# Patient Record
(demographics unavailable — no encounter records)

---

## 2024-11-07 NOTE — PHYSICAL EXAM
[Midline] : trachea located in midline position [Normal] : no rashes [Nasal Endoscopy Performed] : nasal endoscopy was performed, see procedure section for findings [] : septum deviated bilaterally [de-identified] : cerumen in the ear canals; once removed normal EACs

## 2024-11-07 NOTE — CONSULT LETTER
[Dear  ___] : Dear  [unfilled], [Consult Letter:] : I had the pleasure of evaluating your patient, [unfilled]. [Please see my note below.] : Please see my note below. [Consult Closing:] : Thank you very much for allowing me to participate in the care of this patient.  If you have any questions, please do not hesitate to contact me. [Sincerely,] : Sincerely, [FreeTextEntry3] : Larry Griffith MD [facility] Associated Professor, [place]

## 2024-11-07 NOTE — END OF VISIT
[FreeTextEntry3] : I, Dr. Griffith personally performed the evaluation and management (E/M) services including all necessary procedures, for this new patient. That E/M includes conducting the clinically appropriate initial history &/or exam, assessing all conditions, and establishing the plan of care. Today, my JUNI, Alejandra Barnett, was here to observe &/or participate in the visit & follow plan of care established by me.

## 2024-11-07 NOTE — ASSESSMENT
[FreeTextEntry1] : Patient diminished hearing massive cerumen impaction impacted removed gave her some Floxin otic drops because she is very dry audiogram confirm essentially normal hearing also has postnasal drip endoscopically has a deviated septum cannot tolerate Flonase gives her headaches so we switched her over to azelastine to see if that will help with the postnasal drip.

## 2024-11-07 NOTE — HISTORY OF PRESENT ILLNESS
[de-identified] : Patient went to urgent care for another complaint but was told that she had cerumen in both ears. She does clean the ears at home. She admits that her hearing has been slightly muffled bilaterally. She denies ringing in the ears or dizziness  She does complain of nasal congestion with postnasal drip. She has been using FLonase since going to urgent care for an acute cold. It does give her headaches but she uses it anyway She has seasonal allergies and takes zyrtec

## 2024-11-07 NOTE — REVIEW OF SYSTEMS
[Hearing Loss] : hearing loss [Negative] : Heme/Lymph [Post Nasal Drip] : post nasal drip [As Noted in HPI] : as noted in HPI [Nasal Congestion] : nasal congestion

## 2024-12-17 NOTE — HISTORY OF PRESENT ILLNESS
[FreeTextEntry1] : Patient is a 47 year old female here today for a follow up visit.  She has a history of a benign left 5:00 ultrasound guided breast biopsy (3/15/2011) Left 7:30 benign core biopsy (5/2008) Left 1:30 benign core biopsy (2005) Family history of breast cancer in her maternal aunt in her early 60's and maternal great aunt. 7/26/2024 Bilateral breast MRI (high risk screening):  RIGHT BREAST: There are scattered enhancing nonspecific foci.  There is no suspicious enhancement in the right breast. LEFT BREAST: There are scattered enhancing nonspecific foci.  There is no suspicious enhancement in the left breast. Susceptibility artifact from a biopsy marker is noted in the lower breast. AXILLA/OTHER: There is no significant axillary or internal mammary lymphadenopathy. BI-RADS 2 10/31/2024 [facility]: 27.3%. The breasts are extremely dense, which lowers the sensitivity of mammography. No suspicious mass, suspicious microcalcifications, or other sign of malignancy is identified.  There is postbiopsy metallic clip in stable position and stable associated nodular asymmetry. BREAST ARTERIAL CALCIFICATION (JESSE): Grade 0 - No vascular calcifications. Note: The absence of breast arterial calcification does not exclude cardiovascular disease. Management of cardiovascular risk factors should be based clinically. Bilateral ultrasound: Right: No suspicious solid mass.  Multiple cysts and cyst clusters. Left: No suspicious solid mass. Multiple cysts and cyst clusters. 5:00 5 cm from the nipple 1.0 x 0.7 x 1.2 cm hypoechoic mass of prior benign biopsy site stable. BI-RADS 2  She states that she can feel the left lower outer known palpable mass.  She denies any new concerns.

## 2024-12-17 NOTE — PHYSICAL EXAM
[Normocephalic] : normocephalic [Atraumatic] : atraumatic [EOMI] : extra ocular movement intact [Sclera nonicteric] : sclera nonicteric [Supple] : supple [No Supraclavicular Adenopathy] : no supraclavicular adenopathy [No Cervical Adenopathy] : no cervical adenopathy [No Thyromegaly] : no thyromegaly [Examined in the supine and seated position] : examined in the supine and seated position [Asymmetrical] : asymmetrical [No dominant masses] : no dominant masses in right breast  [No dominant masses] : no dominant masses left breast [No Nipple Retraction] : no left nipple retraction [No Nipple Discharge] : no left nipple discharge [No Axillary Lymphadenopathy] : no left axillary lymphadenopathy [No Edema] : no edema [No Rashes] : no rashes [No Ulceration] : no ulceration [de-identified] : Left breast is larger than her right. [de-identified] : 5:00 subtle palpable mass (N4cm) 1.1 x 1.2 cm c/w site of previously biopsied fibroadenoma.

## 2024-12-17 NOTE — ASSESSMENT
[FreeTextEntry1] : Elevated breast cancer YUMIKO risk Clinical breast exam negative Breast imaging negative to date  1. Annual bilateral mammogram and breast ultrasound due 11/2025 2. Follow up office visit due in 1 year 3. Advised monthly self breast examinations and advised her to contact me if she has any concerns. 4. Bilateral breast MRI with IV contrast due 7/2025

## 2024-12-17 NOTE — CONSULT LETTER
[Dear  ___] : Dear  [unfilled], [Courtesy Letter:] : I had the pleasure of seeing your patient, [unfilled], in my office today. [Please see my note below.] : Please see my note below. [Sincerely,] : Sincerely, [DrStalin  ___] : Dr. JAIMES [FreeTextEntry3] : Susan M. Palleschi, MD, FACS\par  Division of Breast Surgery\par  Director, Breast Surgery\par  [facility]\par  [address] [place] [address]\par  [address]\[place] [address]\par  (Phone) [phone]\par  (Fax) [phone]

## 2024-12-17 NOTE — HISTORY OF PRESENT ILLNESS
[FreeTextEntry1] : Patient is a 47 year old female here today for a follow up visit.  She has a history of a benign left 5:00 ultrasound guided breast biopsy (3/15/2011) Left 7:30 benign core biopsy (5/2008) Left 1:30 benign core biopsy (2005) Family history of breast cancer in her maternal aunt in her early 60's and maternal great aunt. 7/26/2024 Bilateral breast MRI (high risk screening):  RIGHT BREAST: There are scattered enhancing nonspecific foci.  There is no suspicious enhancement in the right breast. LEFT BREAST: There are scattered enhancing nonspecific foci.  There is no suspicious enhancement in the left breast. Susceptibility artifact from a biopsy marker is noted in the lower breast. AXILLA/OTHER: There is no significant axillary or internal mammary lymphadenopathy. BI-RADS 2 10/31/2024 [facility] 27.[facility]%. The breasts are extremely dense, which lowers the sensitivity of mammography. No suspicious mass, suspicious microcalcifications, or other sign of malignancy is identified.  There is postbiopsy metallic clip in stable position and stable associated nodular asymmetry. BREAST ARTERIAL CALCIFICATION (JESSE): Grade 0 - No vascular calcifications. Note: The absence of breast arterial calcification does not exclude cardiovascular disease. Management of cardiovascular risk factors should be based clinically. Bilateral ultrasound: Right: No suspicious solid mass.  Multiple cysts and cyst clusters. Left: No suspicious solid mass. Multiple cysts and cyst clusters. 5:00 5 cm from the nipple 1.0 x 0.7 x 1.2 cm hypoechoic mass of prior benign biopsy site stable. BI-RADS 2  She states that she can feel the left lower outer known palpable mass.  She denies any new concerns.

## 2024-12-17 NOTE — PHYSICAL EXAM
[Normocephalic] : normocephalic [Atraumatic] : atraumatic [EOMI] : extra ocular movement intact [Sclera nonicteric] : sclera nonicteric [Supple] : supple [No Supraclavicular Adenopathy] : no supraclavicular adenopathy [No Cervical Adenopathy] : no cervical adenopathy [No Thyromegaly] : no thyromegaly [Examined in the supine and seated position] : examined in the supine and seated position [Asymmetrical] : asymmetrical [No dominant masses] : no dominant masses in right breast  [No dominant masses] : no dominant masses left breast [No Nipple Retraction] : no left nipple retraction [No Nipple Discharge] : no left nipple discharge [No Axillary Lymphadenopathy] : no left axillary lymphadenopathy [No Edema] : no edema [No Rashes] : no rashes [No Ulceration] : no ulceration [de-identified] : Left breast is larger than her right. [de-identified] : 5:00 subtle palpable mass (N4cm) 1.1 x 1.2 cm c/w site of previously biopsied fibroadenoma.

## 2024-12-17 NOTE — CONSULT LETTER
July 30, 2018     Jaclyn Hector, [address] [place] [address]    Patient: Aston Novoa  YOB: 1935   Date of Visit: 7/30/2018       Dear Dr Gregory Dear: Thank you for referring Britton Hackett to me for evaluation  Below are my notes for this consultation  If you have questions, please do not hesitate to call me  I look forward to following your patient along with you  Sincerely,        Mary Jo Esquivel MD        CC: No Recipients  Mary Jo Esquivel MD  7/30/2018  9:28 AM  Sign at close encounter  Assessment/Plan:   1  Gross hematuria on Eliquis  Renal ultrasound and cystourethroscopy failed to reveal any acute pathology that requires intervention  2   BPH with obstructive symptoms  The patient continues on Hytrin 2 mg p o  q h s  with excellent response  No change in therapy is recommended  Diagnoses and all orders for this visit:    BPH with urinary obstruction  -     POCT urine dip auto non-scope  -     POCT Measure PVR  -     Comprehensive metabolic panel; Future          Subjective:     Patient ID: Aston Novoa  is a 80 y o  male  Chief complaint:  Blood in urine    History of present illness:  70-year-old male who is on Eliquis returns for discussion of renal ultrasound results  He reported gross painless hematuria and underwent cystoscopy at the time shortly after presentation in June of 2018 with no evidence of lower urinary tract lesions  Renal ultrasound results will be reviewed with the patient today  Currently he notes a good urinary flow no urgency gross hematuria dysuria incontinence  Minimal nocturia is noted  The patient is pleased with results of his urinary voiding pattern on Hytrin 2 mg p o  q h s           Review of Systems   Constitutional: Negative  HENT: Positive for hearing loss  Respiratory: Negative  Cardiovascular: Negative  Gastrointestinal: Negative  Genitourinary: Positive for frequency and hematuria  Neurological: Negative  Psychiatric/Behavioral: Negative  Objective:     Physical Exam   Constitutional: He is oriented to person, place, and time  He appears well-nourished  HENT:   Head: Atraumatic  Eyes: EOM are normal    Neck: Neck supple  Pulmonary/Chest: Effort normal  No respiratory distress  Abdominal: Soft  Neurological: He is alert and oriented to person, place, and time  Psychiatric: He has a normal mood and affect  His behavior is normal  Judgment and thought content normal    Vitals reviewed  [Dear  ___] : Dear  [unfilled], [Courtesy Letter:] : I had the pleasure of seeing your patient, [unfilled], in my office today. [Please see my note below.] : Please see my note below. [Sincerely,] : Sincerely, [DrStalin  ___] : Dr. JAIMES [FreeTextEntry3] : Susan M. Palleschi, MD, FACS\par  Division of Breast Surgery\par  Director, Breast Surgery\par  [facility]\par  [address] [place] [address]\par  [address]\[place] [address]\par  (Phone) [phone]\par  (Fax) [phone]

## 2025-03-28 NOTE — HISTORY OF PRESENT ILLNESS
[FreeTextEntry1] : CPE [de-identified] : 46 yo F w PMH fibrocystic breasts, hypothyroidism presenting for CPE.   Last seen Oct 2023 for CPE; usually sees PCP Nate but added on as provider unavailable today Endo: following for hypothyroidism; needs new endo as last endo passed away; has records to be uploaded in chart Breast Surgeon: following for fibrocystic breast; due for MRI breast due July 2025

## 2025-03-28 NOTE — PHYSICAL EXAM
[de-identified] : Constitutional:  no acute distress and well-appearing Eyes:  normal sclera/conjunctiva and pupils equal round and reactive to light ENT:  the outer ears and nose were normal in appearance, the oropharynx was normal and both tympanic membranes were normal Lymph: no posterior cervical lymphadenopathy and no anterior cervical lymphadenopathy Pulmonary:  no respiratory distress, lungs were clear to auscultation bilaterally Cardiac:  normal rate, normal S1 and S2 and no murmur heard Vascular:  there was no peripheral edema Abdomen:  abdomen soft, non-tender and normal bowel sounds Genitourinary: deferred at this time Musculoskeletal:  no joint swelling and grossly normal strength/tone Skin: no rash or skin lesions visualized Neurology:  coordination grossly intact and no focal deficits Psychiatric:  the affect was normal and the mood was normal

## 2025-03-28 NOTE — ASSESSMENT
[FreeTextEntry1] : 46 yo F w PMH fibrocystic breasts, hypothyroidism presenting for CPE.  Return to clinic in one year or sooner as needed.  Healthcare Maintenance: Diet and exercise encouraged Medical history reviewed and updated Medication reconciliation done; refills ordered as needed Routine labs ordered Vaccinations reviewed; Tdap: 2016   Influenza: UTD Mammogram: 10/24. following w breast surgeon.   PAP Smear: 2021. Patient reported PAP Smear was normal.   Colonoscopy: 09/24. SSA and hyperplastic polyp; repeat in 5 years.    Stay up to date with ophthalmology and dental  Hypothyroidism -needs new endo; referral sent -c/w levothyroxine 75mcg daily -repeat labs today  Fibrocystic Breast -following w breast surgeon

## 2025-03-28 NOTE — HEALTH RISK ASSESSMENT
[Good] : ~his/her~  mood as  good [No falls in past year] : Patient reported no falls in the past year [Never] : Never [Patient reported PAP Smear was normal] : Patient reported PAP Smear was normal [Fully functional (bathing, dressing, toileting, transferring, walking, feeding)] : Fully functional (bathing, dressing, toileting, transferring, walking, feeding) [Fully functional (using the telephone, shopping, preparing meals, housekeeping, doing laundry, using] : Fully functional and needs no help or supervision to perform IADLs (using the telephone, shopping, preparing meals, housekeeping, doing laundry, using transportation, managing medications and managing finances) [No] : In the past 12 months have you used drugs other than those required for medical reasons? No [0] : 2) Feeling down, depressed, or hopeless: Not at all (0) [PHQ-2 Negative - No further assessment needed] : PHQ-2 Negative - No further assessment needed [No Retinopathy] : No retinopathy [YPE2Cwule] : 0 [EyeExamDate] : 2024 [Reports changes in hearing] : Reports no changes in hearing [Reports changes in vision] : Reports no changes in vision [Reports changes in dental health] : Reports no changes in dental health [MammogramDate] : 10/24 [MammogramComments] : following w breast surgeon [PapSmearDate] : 2021 [ColonoscopyDate] : 09/24 [ColonoscopyComments] : SSA and hyperplastic polyp; repeat in 5 years